# Patient Record
Sex: MALE | Race: WHITE | NOT HISPANIC OR LATINO | Employment: FULL TIME | URBAN - METROPOLITAN AREA
[De-identification: names, ages, dates, MRNs, and addresses within clinical notes are randomized per-mention and may not be internally consistent; named-entity substitution may affect disease eponyms.]

---

## 2022-08-24 ENCOUNTER — TRANSCRIBE ORDERS (OUTPATIENT)
Dept: ADMINISTRATIVE | Facility: HOSPITAL | Age: 42
End: 2022-08-24

## 2022-08-24 DIAGNOSIS — J98.59 DISORDER OF MEDIASTINUM: ICD-10-CM

## 2022-08-24 DIAGNOSIS — R07.89 CHEST PRESSURE: Primary | ICD-10-CM

## 2022-09-12 ENCOUNTER — HOSPITAL ENCOUNTER (OUTPATIENT)
Dept: CT IMAGING | Facility: HOSPITAL | Age: 42
Discharge: HOME | End: 2022-09-12
Admitting: GENERAL PRACTICE

## 2022-09-12 DIAGNOSIS — R07.89 CHEST PRESSURE: ICD-10-CM

## 2022-09-12 DIAGNOSIS — J98.59 DISORDER OF MEDIASTINUM: ICD-10-CM

## 2022-09-12 PROCEDURE — 25010000002 IOPAMIDOL 61 % SOLUTION: Performed by: GENERAL PRACTICE

## 2022-09-12 PROCEDURE — 71260 CT THORAX DX C+: CPT

## 2022-09-12 RX ADMIN — IOPAMIDOL 75 ML: 612 INJECTION, SOLUTION INTRAVENOUS at 11:04

## 2022-09-22 ENCOUNTER — APPOINTMENT (OUTPATIENT)
Dept: CT IMAGING | Facility: HOSPITAL | Age: 42
End: 2022-09-22

## 2022-10-20 ENCOUNTER — OFFICE VISIT (OUTPATIENT)
Dept: CARDIOLOGY | Facility: CLINIC | Age: 42
End: 2022-10-20

## 2022-10-20 VITALS
DIASTOLIC BLOOD PRESSURE: 82 MMHG | SYSTOLIC BLOOD PRESSURE: 132 MMHG | WEIGHT: 219 LBS | HEART RATE: 48 BPM | HEIGHT: 70 IN | BODY MASS INDEX: 31.35 KG/M2

## 2022-10-20 DIAGNOSIS — R07.89 CHEST PAIN, ATYPICAL: Primary | ICD-10-CM

## 2022-10-20 PROCEDURE — 99204 OFFICE O/P NEW MOD 45 MIN: CPT | Performed by: INTERNAL MEDICINE

## 2022-10-20 PROCEDURE — 93000 ELECTROCARDIOGRAM COMPLETE: CPT | Performed by: INTERNAL MEDICINE

## 2022-10-20 NOTE — PROGRESS NOTES
[facility]      Patient Name: Devin Kamara  :1980  Age: 42 y.o.  Encounter Provider:  Josep Albret Jr, MD      Chief Complaint:   Chief Complaint   Patient presents with   • Chest Pain         HPI  Devin Kamara is a 42 y.o. male with family history of premature coronary artery disease presents for initial evaluation of chest discomfort.  Over the last few months patient has been experiencing random episodes of chest tightness.  No relationship to physical activity.  No associated nausea, vomiting or diaphoresis.  He does note difficulty taking deep breaths but is not short of air per se.  No orthopnea, PND or edema.  No palpitations, dizziness or syncope.  He does not take medications on a regular basis.  He is an ex-smoker who quit 18 years ago, drinks socially and denies illicit drug use.  Father had first heart attack in his early 40s and has multiple stents.      The following portions of the patient's history were reviewed and updated as appropriate: allergies, current medications, past family history, past medical history, past social history, past surgical history and problem list.      Review of Systems   Constitutional: Negative for chills and fever.   HENT: Negative for hoarse voice and sore throat.    Eyes: Negative for double vision and photophobia.   Cardiovascular: Positive for chest pain. Negative for leg swelling, near-syncope, orthopnea, palpitations, paroxysmal nocturnal dyspnea and syncope.   Respiratory: Negative for cough and wheezing.    Skin: Negative for poor wound healing and rash.   Musculoskeletal: Negative for arthritis and joint swelling.   Gastrointestinal: Negative for bloating, abdominal pain, hematemesis and hematochezia.   Neurological: Negative for dizziness and focal weakness.   Psychiatric/Behavioral: Negative for depression and suicidal ideas.       OBJECTIVE:   Vital Signs  Vitals:    10/20/22 0950   BP: 132/82   Pulse: (!) 48     Estimated  "body mass index is 31.42 kg/m² as calculated from the following:    Height as of this encounter: 177.8 cm (70\").    Weight as of this encounter: 99.3 kg (219 lb).    Vitals reviewed.   Constitutional:       Appearance: Healthy appearance. Not in distress.   Neck:      Vascular: No JVR. JVD normal.   Pulmonary:      Effort: Pulmonary effort is normal.      Breath sounds: Normal breath sounds. No wheezing. No rhonchi. No rales.   Chest:      Chest wall: Not tender to palpatation.   Cardiovascular:      PMI at left midclavicular line. Normal rate. Regular rhythm. Normal S1. Normal S2.      Murmurs: There is no murmur.      No gallop. No click. No rub.   Pulses:     Intact distal pulses.   Edema:     Peripheral edema absent.   Abdominal:      General: Bowel sounds are normal.      Palpations: Abdomen is soft.      Tenderness: There is no abdominal tenderness.   Musculoskeletal: Normal range of motion.         General: No tenderness. Skin:     General: Skin is warm and dry.   Neurological:      General: No focal deficit present.      Mental Status: Alert and oriented to person, place and time.           ECG 12 Lead    Date/Time: 10/20/2022 10:22 AM  Performed by: Josep Albert Jr., MD  Authorized by: Josep Albert Jr., MD   Comparison: not compared with previous ECG   Previous ECG: no previous ECG available  Rhythm: sinus bradycardia    Clinical impression: non-specific ECG                  ASSESSMENT:     Atypical chest pain  Sinus bradycardia  Family history of premature coronary artery disease    PLAN OF CARE:     1. 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  Given his family history we will plan for treadmill stress study.  We discussed further screening with coronary artery calcium score and he would like to move forward with the testing.  2. Sinus bradycardia -asymptomatic.  Likely athletic heart.  Monitor " closely.  3. Family history of premature coronary artery disease -testing planned as above.    Return to clinic 3 months             Discharge Medications      as of October 20, 2022  9:57 AM     Patient Not Prescribed Medications Upon Discharge         Thank you for allowing me to participate in the care of your patient,      Sincerely,   Josep Albert MD  [facility]  10/20/22  09:57 EDT

## 2022-11-10 ENCOUNTER — TELEPHONE (OUTPATIENT)
Dept: CARDIOLOGY | Facility: CLINIC | Age: 42
End: 2022-11-10

## 2022-11-10 ENCOUNTER — HOSPITAL ENCOUNTER (OUTPATIENT)
Dept: CARDIOLOGY | Facility: HOSPITAL | Age: 42
Discharge: HOME | End: 2022-11-10
Admitting: INTERNAL MEDICINE

## 2022-11-10 DIAGNOSIS — R07.89 CHEST PAIN, ATYPICAL: ICD-10-CM

## 2022-11-10 LAB
BH CV STRESS BP STAGE 1: NORMAL
BH CV STRESS BP STAGE 2: NORMAL
BH CV STRESS BP STAGE 3: NORMAL
BH CV STRESS DURATION MIN STAGE 1: 3
BH CV STRESS DURATION MIN STAGE 2: 3
BH CV STRESS DURATION MIN STAGE 3: 3
BH CV STRESS DURATION MIN STAGE 4: 1
BH CV STRESS DURATION SEC STAGE 1: 0
BH CV STRESS DURATION SEC STAGE 2: 0
BH CV STRESS DURATION SEC STAGE 3: 0
BH CV STRESS DURATION SEC STAGE 4: 30
BH CV STRESS GRADE STAGE 1: 10
BH CV STRESS GRADE STAGE 2: 12
BH CV STRESS GRADE STAGE 3: 14
BH CV STRESS GRADE STAGE 4: 16
BH CV STRESS HR STAGE 1: 83
BH CV STRESS HR STAGE 2: 113
BH CV STRESS HR STAGE 3: 160
BH CV STRESS HR STAGE 4: 182
BH CV STRESS METS STAGE 1: 5
BH CV STRESS METS STAGE 2: 7.5
BH CV STRESS METS STAGE 3: 10
BH CV STRESS METS STAGE 4: 13.5
BH CV STRESS PROTOCOL 1: NORMAL
BH CV STRESS RECOVERY BP: NORMAL MMHG
BH CV STRESS RECOVERY HR: 98 BPM
BH CV STRESS SPEED STAGE 1: 1.7
BH CV STRESS SPEED STAGE 2: 2.5
BH CV STRESS SPEED STAGE 3: 3.4
BH CV STRESS SPEED STAGE 4: 4.2
BH CV STRESS STAGE 1: 1
BH CV STRESS STAGE 2: 2
BH CV STRESS STAGE 3: 3
BH CV STRESS STAGE 4: 4
MAXIMAL PREDICTED HEART RATE: 179 BPM
PERCENT MAX PREDICTED HR: 101.68 %
STRESS BASELINE BP: NORMAL MMHG
STRESS BASELINE HR: 71 BPM
STRESS PERCENT HR: 120 %
STRESS POST ESTIMATED WORKLOAD: 12 METS
STRESS POST EXERCISE DUR MIN: 10 MIN
STRESS POST EXERCISE DUR SEC: 30 SEC
STRESS POST PEAK BP: NORMAL MMHG
STRESS POST PEAK HR: 182 BPM
STRESS TARGET HR: 152 BPM

## 2022-11-10 PROCEDURE — 93016 CV STRESS TEST SUPVJ ONLY: CPT | Performed by: INTERNAL MEDICINE

## 2022-11-10 PROCEDURE — 93017 CV STRESS TEST TRACING ONLY: CPT

## 2022-11-10 PROCEDURE — 93018 CV STRESS TEST I&R ONLY: CPT | Performed by: INTERNAL MEDICINE

## 2022-11-10 NOTE — TELEPHONE ENCOUNTER
Patient had very atypical chest pain at the end of stage IV but had excellent functional capacity with no electrocardiographic evidence of ischemia.  Given his family history of coronary artery disease we already have him scheduled for a coronary calcium score.  We will follow those results before making any determination for need of further testing.

## 2023-01-25 ENCOUNTER — OFFICE VISIT (OUTPATIENT)
Dept: CARDIOLOGY | Facility: CLINIC | Age: 43
End: 2023-01-25
Payer: COMMERCIAL

## 2023-01-25 VITALS
WEIGHT: 222.4 LBS | DIASTOLIC BLOOD PRESSURE: 90 MMHG | SYSTOLIC BLOOD PRESSURE: 140 MMHG | BODY MASS INDEX: 31.84 KG/M2 | HEIGHT: 70 IN | HEART RATE: 68 BPM | OXYGEN SATURATION: 97 %

## 2023-01-25 DIAGNOSIS — R07.89 CHEST PAIN, ATYPICAL: Primary | ICD-10-CM

## 2023-01-25 DIAGNOSIS — E66.2 CLASS 1 OBESITY WITH ALVEOLAR HYPOVENTILATION WITHOUT SERIOUS COMORBIDITY WITH BODY MASS INDEX (BMI) OF 31.0 TO 31.9 IN ADULT: ICD-10-CM

## 2023-01-25 DIAGNOSIS — R03.0 TRANSIENT ELEVATED BLOOD PRESSURE: ICD-10-CM

## 2023-01-25 DIAGNOSIS — Z82.49 FAMILY HISTORY OF PREMATURE CORONARY ARTERY DISEASE: ICD-10-CM

## 2023-01-25 PROCEDURE — 99214 OFFICE O/P EST MOD 30 MIN: CPT | Performed by: NURSE PRACTITIONER

## 2023-01-25 PROCEDURE — 93000 ELECTROCARDIOGRAM COMPLETE: CPT | Performed by: NURSE PRACTITIONER

## 2023-01-25 NOTE — PROGRESS NOTES
"Date of Office Visit: 23  Encounter Provider: EDDIE Adame  Place of Service: [facility] CARDIOLOGY  Patient Name: Devin Kamara  :1980    Chief Complaint   Patient presents with   • Chest Pain     Chest pressure   • Follow-up   :     HPI: Devin Kamara is a 42 y.o. male  with asymptomatic sinus bradycardia, hyperlipidemia, obesity, atypical chest pain and family history of premature coronary artery disease involving his father had a heart attack in his early 40s with multiple stents.        He was seen by Dr. Josep Albert. I will visit with him for the first time and have reviewed his medical record.      He complained of atypical chest pain and had nonexercise limiting chest pain during treadmill exercise study 2022.  He exercised for 10 minutes on standard Kranthi protocol and had a Duke treadmill score of 6.5 which is consistent with low risk for ischemic heart disease.    He presents today for reassessment.  He continues to have pressure and tightness intermittently.  These episodes last up to an hour in the center of his chest with no radiation or associated symptoms.  This does not occur when he walks or runs which he does 2 times per week.  He has been told on separate occasions that he has borderline elevated blood pressure but did not require medication.  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  He sometimes experiences chest pressure while working.  He is concerned due to his family cardiac history  No Known Allergies        Family and social history reviewed.     ROS  All other systems were reviewed and are negative          Objective:     Vitals:    23 0854   BP: 140/90   BP Location: Left arm   Patient Position: Sitting   Pulse: 68   SpO2: 97%   Weight: 101 kg (222 lb 6.4 oz)   Height: 177.8 cm (70\")     Body mass index is 31.91 kg/m².    PHYSICAL EXAM:  Pulmonary:      Effort: Pulmonary effort is normal.   Cardiovascular:      " Normal rate. Regular rhythm.           ECG 12 Lead    Date/Time: 1/25/2023 9:17 AM  Performed by: Rhona Willingham APRN  Authorized by: Rhona Willingham APRN   Comparison: compared with previous ECG   Similar to previous ECG  Rhythm: sinus rhythm  Rate: normal  T flattening: III and aVF  QRS axis: normal  Comments: No significant change              No current outpatient medications on file.     No current facility-administered medications for this visit.     Assessment:       Diagnosis Plan   1. Chest pain, atypical  ECG 12 Lead    CT Angiogram Coronary      2. Family history of premature coronary artery disease  ECG 12 Lead    CT Angiogram Coronary      3. Class 1 obesity with alveolar hypoventilation without serious comorbidity with body mass index (BMI) of 31.0 to 31.9 in adult (HCC)        4. Transient elevated blood pressure             Orders Placed This Encounter   Procedures   • CT Angiogram Coronary     Standing Status:   Future     Standing Expiration Date:   1/25/2024   • ECG 12 Lead     This order was created via procedure documentation     Order Specific Question:   Release to patient     Answer:   Routine Release         Plan:   1.  42-year-old gentleman with atypical chest pain.  He had treadmill stress 11/2022 EKG today shows no significant change.  Still complaining of intermittent chest tightness and pressure.  These episodes are nonexertional but given his family history as well as history of smoking we will proceed with CTA coronary.  His pulse is usually 50-mid 60s.  At this time I would not plan to give him metoprolol prior to.  His last resting EKG showed a pulse of 48.  2.  Hyperlipidemia.  Reviewed lipid panel from May 2021 with   3.  Obesity  4.  Family history of coronary artery disease involving his father who had multiple stents starting in his early 40s  5.  Transient elevated blood pressure reading.  I recommended blood pressure monitoring at home.  I encouraged that he purchase a  home blood pressure machine  6. Left 6 mm lung nodule.on CT 09/2022. Given his history of fomrer tobacco use, would recommend a repeat designated CT after one year  7.Evidence of cholelithiasis on CT of the chest August 2022. He states he was not aware of this.     Further recommendation pending home blood pressure log review as well as CTA coronary results.          It has been a pleasure to participate in this patient's care.      Thank you,  EDDIE Adame      **I used Dragon to dictate this note:**

## 2023-04-05 ENCOUNTER — HOSPITAL ENCOUNTER (OUTPATIENT)
Dept: CT IMAGING | Facility: HOSPITAL | Age: 43
Discharge: HOME | End: 2023-04-05
Admitting: NURSE PRACTITIONER
Payer: COMMERCIAL

## 2023-04-05 VITALS
RESPIRATION RATE: 16 BRPM | DIASTOLIC BLOOD PRESSURE: 60 MMHG | TEMPERATURE: 98 F | WEIGHT: 210 LBS | OXYGEN SATURATION: 99 % | HEIGHT: 70 IN | HEART RATE: 73 BPM | BODY MASS INDEX: 30.06 KG/M2 | SYSTOLIC BLOOD PRESSURE: 121 MMHG

## 2023-04-05 DIAGNOSIS — Z82.49 FAMILY HISTORY OF PREMATURE CORONARY ARTERY DISEASE: ICD-10-CM

## 2023-04-05 DIAGNOSIS — R07.89 CHEST PAIN, ATYPICAL: ICD-10-CM

## 2023-04-05 LAB — QT INTERVAL: 413 MS

## 2023-04-05 PROCEDURE — 75574 CT ANGIO HRT W/3D IMAGE: CPT

## 2023-04-05 PROCEDURE — 75574 CT ANGIO HRT W/3D IMAGE: CPT | Performed by: INTERNAL MEDICINE

## 2023-04-05 PROCEDURE — 25510000001 IOPAMIDOL PER 1 ML: Performed by: NURSE PRACTITIONER

## 2023-04-05 PROCEDURE — 93005 ELECTROCARDIOGRAM TRACING: CPT | Performed by: NURSE PRACTITIONER

## 2023-04-05 RX ORDER — NITROGLYCERIN 0.4 MG/1
0.8 TABLET SUBLINGUAL ONCE
Status: COMPLETED | OUTPATIENT
Start: 2023-04-05 | End: 2023-04-05

## 2023-04-05 RX ADMIN — IOPAMIDOL 98 ML: 755 INJECTION, SOLUTION INTRAVENOUS at 11:27

## 2023-04-05 RX ADMIN — NITROGLYCERIN 0.8 MG: 0.4 TABLET SUBLINGUAL at 11:22

## 2023-04-05 NOTE — NURSING NOTE
Pt arrived in radiology triage for CTA hear.    Protective goggles and mask in place with all patient interactions today

## 2023-04-05 NOTE — NURSING NOTE
1020 Called Ginger Jose with [facility]. Orders received for Nitro on table from Dr. Jefferson    .

## 2023-04-06 ENCOUNTER — DOCUMENTATION (OUTPATIENT)
Dept: CARDIOLOGY | Facility: CLINIC | Age: 43
End: 2023-04-06
Payer: COMMERCIAL

## 2023-04-06 ENCOUNTER — TELEPHONE (OUTPATIENT)
Dept: CARDIOLOGY | Facility: CLINIC | Age: 43
End: 2023-04-06
Payer: COMMERCIAL

## 2023-04-06 NOTE — PROGRESS NOTES
Cardiac CTA with morphology  4/5/23  Reason for the exam: CP    Calcium score is 0 Agatston units.  This is between the 1-25 percentile for men between the ages of 40-45 years old.    Left ventricular end-diastolic volume 171 mL.  Left ventricular end-systolic volume 60 mL.  Ejection fraction 65%.  Stroke volume 112 mL.  Cardiac output 5.39L/m    The right atrium is normal in size.  The right ventricle is normal in size.  There is grossly normal right ventricular systolic function.  The pulmonary artery is normal in size.  There are 4 pulmonary veins which enter the left atrium in their expected location.  The left atrial appendage was visualized and is without thrombus.  The intra-atrial septum appeared to be intact.  The left ventricle is normal in size with normal systolic function.  There was no evidence of a left ventricular thrombus.  The intraventricular septum appeared to be intact.  The mitral valve appeared structurally normal.  The aortic valve was trileaflet and appears structurally and functionally normal.  The pulmonic valve appeared structurally normal.  The tricuspid valve appeared structurally normal.  There was no pericardial effusion.  The pericardium appeared normal.    The left main coronary artery came off the left coronary cusp in its anticipated location.  It bifurcated into the left anterior descending artery and the circumflex coronary artery.  There was no evidence of atherosclerotic disease of the left main coronary artery.  Left anterior descending artery wraps around the apex of the heart.  There is no evidence of atherosclerotic disease.  The circumflex artery is the nondominant vessel with no evidence of atherosclerotic disease.  The right coronary artery is the dominant vessel with no evidence of atherosclerotic disease.    Conclusions:  1.  Normal coronary artery anatomy without evidence of atherosclerotic disease.  Calcium score is 0 Agatston units.  2.  Structurally normal  heart.    Ritika Jefferson MD  04/06/23

## 2023-04-06 NOTE — TELEPHONE ENCOUNTER
Notified patient of results/recommendations. Patient verbalized understanding.    Zina Damian RN  Triage [facility]

## 2023-04-06 NOTE — TELEPHONE ENCOUNTER
Please inform patient CTA coronary shows structurally normal heart and normal coronary arteries.  His calcium score is 0.  We have not found an intracardiac cause of his chest pain.  I would recommend follow-up with his PCP and consideration for GI evaluation

## 2023-04-07 ENCOUNTER — TELEPHONE (OUTPATIENT)
Dept: CARDIOLOGY | Facility: CLINIC | Age: 43
End: 2023-04-07
Payer: COMMERCIAL

## 2023-04-07 DIAGNOSIS — R07.89 CHEST PAIN, ATYPICAL: ICD-10-CM

## 2023-04-07 DIAGNOSIS — R93.89 ABNORMAL CT SCAN: Primary | ICD-10-CM

## 2023-04-07 DIAGNOSIS — R06.02 SOB (SHORTNESS OF BREATH): ICD-10-CM

## 2023-04-07 NOTE — TELEPHONE ENCOUNTER
I personally spoke with patient and reviewed he has structurally normal coronary anatomy and no evidence of coronary atherosclerotic with a calcium score of 0.  After discussing with the radiologist there is concern for possible pneumonia however these images showed limited lung exam.  Patient denies fever, body aches or chills.  His main complaint is shortness of breath described as he cannot take a deep breath and chest pain intermittently both with breathing and without.  Patient would like to hold off on me prescribing him an antibiotic which I agree with as I would prefer more imaging of his lungs for further evaluation.  I have placed a stat CT order for this.    Hospital scheduling- Stat CT chest ordered. Can this be done no later than next Tuesday or weds? He knows that you may call him today.     Dr. Carlos Manuel RIVERA. However, if this turns out to be PNA, I would greatly appreciate your assistance to treat. I would reach back out as well, if needed. ( faxed him a copy of this feed and CT reports)

## 2023-04-12 ENCOUNTER — HOSPITAL ENCOUNTER (OUTPATIENT)
Dept: CT IMAGING | Facility: HOSPITAL | Age: 43
Discharge: HOME | End: 2023-04-12
Admitting: NURSE PRACTITIONER
Payer: COMMERCIAL

## 2023-04-12 DIAGNOSIS — R06.02 SOB (SHORTNESS OF BREATH): ICD-10-CM

## 2023-04-12 DIAGNOSIS — R93.89 ABNORMAL CT SCAN: ICD-10-CM

## 2023-04-12 DIAGNOSIS — R07.89 CHEST PAIN, ATYPICAL: ICD-10-CM

## 2023-04-12 PROCEDURE — 71250 CT THORAX DX C-: CPT

## 2023-04-13 ENCOUNTER — TELEPHONE (OUTPATIENT)
Dept: CARDIOLOGY | Facility: CLINIC | Age: 43
End: 2023-04-13
Payer: COMMERCIAL

## 2023-04-13 RX ORDER — BENZONATATE 100 MG/1
100 CAPSULE ORAL 3 TIMES DAILY PRN
Qty: 30 CAPSULE | Refills: 1 | Status: SHIPPED | OUTPATIENT
Start: 2023-04-13

## 2023-04-13 NOTE — TELEPHONE ENCOUNTER
I called and spoke with patient regarding CT chest without contrast.  Right-sided pneumonia felt to be resolving.  Patient complains of cough.  He continues to deny fever or chills.  He has an appointment with his PCP, Dr. White on Tuesday but that was the first available.  We also discussed incidental liver cyst.  I have asked him to follow-up with Dr. White as scheduled and discussed the significance  of liver abnormality with him.  He will call me with any further questions or concerns